# Patient Record
Sex: FEMALE | Race: WHITE
[De-identification: names, ages, dates, MRNs, and addresses within clinical notes are randomized per-mention and may not be internally consistent; named-entity substitution may affect disease eponyms.]

---

## 2017-11-14 ENCOUNTER — HOSPITAL ENCOUNTER (OUTPATIENT)
Dept: HOSPITAL 84 - D.MAMMO | Age: 72
Discharge: HOME | End: 2017-11-14
Attending: FAMILY MEDICINE
Payer: MEDICARE

## 2017-11-14 DIAGNOSIS — Z12.31: Primary | ICD-10-CM

## 2018-10-30 ENCOUNTER — HOSPITAL ENCOUNTER (OUTPATIENT)
Dept: HOSPITAL 84 - D.MAMMO | Age: 73
Discharge: HOME | End: 2018-10-30
Attending: FAMILY MEDICINE
Payer: MEDICARE

## 2018-10-30 DIAGNOSIS — Z12.31: Primary | ICD-10-CM

## 2019-11-30 ENCOUNTER — HOSPITAL ENCOUNTER (INPATIENT)
Dept: HOSPITAL 84 - D.ER | Age: 74
LOS: 6 days | Discharge: HOME | DRG: 372 | End: 2019-12-06
Attending: FAMILY MEDICINE | Admitting: FAMILY MEDICINE
Payer: MEDICARE

## 2019-11-30 VITALS
HEIGHT: 65 IN | BODY MASS INDEX: 24.49 KG/M2 | BODY MASS INDEX: 24.49 KG/M2 | BODY MASS INDEX: 24.49 KG/M2 | HEIGHT: 65 IN | WEIGHT: 147 LBS | WEIGHT: 147 LBS

## 2019-11-30 VITALS — SYSTOLIC BLOOD PRESSURE: 134 MMHG | DIASTOLIC BLOOD PRESSURE: 73 MMHG

## 2019-11-30 VITALS — SYSTOLIC BLOOD PRESSURE: 173 MMHG | DIASTOLIC BLOOD PRESSURE: 82 MMHG

## 2019-11-30 VITALS — DIASTOLIC BLOOD PRESSURE: 74 MMHG | SYSTOLIC BLOOD PRESSURE: 175 MMHG

## 2019-11-30 VITALS — DIASTOLIC BLOOD PRESSURE: 86 MMHG | SYSTOLIC BLOOD PRESSURE: 187 MMHG

## 2019-11-30 DIAGNOSIS — E87.2: ICD-10-CM

## 2019-11-30 DIAGNOSIS — I10: ICD-10-CM

## 2019-11-30 DIAGNOSIS — A04.5: Primary | ICD-10-CM

## 2019-11-30 DIAGNOSIS — N39.0: ICD-10-CM

## 2019-11-30 DIAGNOSIS — E78.5: ICD-10-CM

## 2019-11-30 DIAGNOSIS — K21.9: ICD-10-CM

## 2019-11-30 DIAGNOSIS — I77.4: ICD-10-CM

## 2019-11-30 DIAGNOSIS — D62: ICD-10-CM

## 2019-11-30 DIAGNOSIS — E86.0: ICD-10-CM

## 2019-11-30 DIAGNOSIS — K92.2: ICD-10-CM

## 2019-11-30 DIAGNOSIS — M54.9: ICD-10-CM

## 2019-11-30 DIAGNOSIS — F17.203: ICD-10-CM

## 2019-11-30 DIAGNOSIS — F41.8: ICD-10-CM

## 2019-11-30 DIAGNOSIS — E87.6: ICD-10-CM

## 2019-11-30 DIAGNOSIS — E83.42: ICD-10-CM

## 2019-11-30 LAB
ALBUMIN SERPL-MCNC: 3.7 G/DL (ref 3.4–5)
ALP SERPL-CCNC: 180 U/L (ref 46–116)
ALT SERPL-CCNC: 16 U/L (ref 10–68)
AMORPHOUS SEDIMENT: (no result) /LPF
ANION GAP SERPL CALC-SCNC: 16.4 MMOL/L (ref 8–16)
APPEARANCE UR: CLEAR
BACTERIA #/AREA URNS HPF: (no result) /HPF
BASOPHILS NFR BLD AUTO: 0.2 % (ref 0–2)
BILIRUB SERPL-MCNC: 0.34 MG/DL (ref 0.2–1.3)
BILIRUB SERPL-MCNC: NEGATIVE MG/DL
BUN SERPL-MCNC: 14 MG/DL (ref 7–18)
CALCIUM SERPL-MCNC: 9.4 MG/DL (ref 8.5–10.1)
CHLORIDE SERPL-SCNC: 101 MMOL/L (ref 98–107)
CK MB SERPL-MCNC: 0.4 U/L (ref 0–3.6)
CK SERPL-CCNC: 45 UL (ref 21–215)
CO2 SERPL-SCNC: 26.1 MMOL/L (ref 21–32)
COLOR UR: YELLOW
CREAT SERPL-MCNC: 1.3 MG/DL (ref 0.6–1.3)
CRP SERPL-MCNC: 1.2 MG/DL (ref 0–0.9)
EOSINOPHIL NFR BLD: 0.2 % (ref 0–7)
ERYTHROCYTE [DISTWIDTH] IN BLOOD BY AUTOMATED COUNT: 12.8 % (ref 11.5–14.5)
GLOBULIN SER-MCNC: 3.6 G/L
GLUCOSE SERPL-MCNC: 100 MG/DL
GLUCOSE SERPL-MCNC: 154 MG/DL (ref 74–106)
HCT VFR BLD CALC: 44.6 % (ref 36–48)
HGB BLD-MCNC: 15.2 G/DL (ref 12–16)
IMM GRANULOCYTES NFR BLD: 0.6 % (ref 0–5)
INR PPP: 1.04 (ref 0.85–1.17)
KETONES UR STRIP-MCNC: NEGATIVE MG/DL
LYMPHOCYTES NFR BLD AUTO: 10.7 % (ref 15–50)
MCH RBC QN AUTO: 31.1 PG (ref 26–34)
MCHC RBC AUTO-ENTMCNC: 34.1 G/DL (ref 31–37)
MCV RBC: 91.4 FL (ref 80–100)
MONOCYTES NFR BLD: 5.4 % (ref 2–11)
NEUTROPHILS NFR BLD AUTO: 82.9 % (ref 40–80)
NITRITE UR-MCNC: NEGATIVE MG/ML
NT-PROBNP SERPL-MCNC: 334 PG/ML (ref 0–125)
OSMOLALITY SERPL CALC.SUM OF ELEC: 282 MOSM/KG (ref 275–300)
PH UR STRIP: 8 [PH] (ref 5–6)
PLATELET # BLD: 256 10X3/UL (ref 130–400)
PMV BLD AUTO: 10.6 FL (ref 7.4–10.4)
POTASSIUM SERPL-SCNC: 3.5 MMOL/L (ref 3.5–5.1)
PROT SERPL-MCNC: 7.3 G/DL (ref 6.4–8.2)
PROT UR-MCNC: (no result) MG/DL
PROTHROMBIN TIME: 13.1 SECONDS (ref 11.6–15)
RBC # BLD AUTO: 4.88 10X6/UL (ref 4–5.4)
SODIUM SERPL-SCNC: 140 MMOL/L (ref 136–145)
SP GR UR STRIP: 1 (ref 1–1.02)
SQUAMOUS #/AREA URNS HPF: (no result) /HPF (ref 0–5)
TROPONIN I SERPL-MCNC: < 0.017 NG/ML (ref 0–0.06)
UROBILINOGEN UR-MCNC: NORMAL MG/DL
WBC # BLD AUTO: 19.7 10X3/UL (ref 4.8–10.8)
WBC #/AREA URNS HPF: (no result) /HPF

## 2019-12-01 VITALS — SYSTOLIC BLOOD PRESSURE: 177 MMHG | DIASTOLIC BLOOD PRESSURE: 78 MMHG

## 2019-12-01 VITALS — SYSTOLIC BLOOD PRESSURE: 150 MMHG | DIASTOLIC BLOOD PRESSURE: 68 MMHG

## 2019-12-01 VITALS — DIASTOLIC BLOOD PRESSURE: 70 MMHG | SYSTOLIC BLOOD PRESSURE: 117 MMHG

## 2019-12-01 VITALS — SYSTOLIC BLOOD PRESSURE: 144 MMHG | DIASTOLIC BLOOD PRESSURE: 60 MMHG

## 2019-12-01 VITALS — SYSTOLIC BLOOD PRESSURE: 113 MMHG | DIASTOLIC BLOOD PRESSURE: 72 MMHG

## 2019-12-01 LAB
ANION GAP SERPL CALC-SCNC: 12.7 MMOL/L (ref 8–16)
BASOPHILS NFR BLD AUTO: 0.1 % (ref 0–2)
BUN SERPL-MCNC: 14 MG/DL (ref 7–18)
CALCIUM SERPL-MCNC: 8.6 MG/DL (ref 8.5–10.1)
CHLORIDE SERPL-SCNC: 103 MMOL/L (ref 98–107)
CO2 SERPL-SCNC: 22.8 MMOL/L (ref 21–32)
CREAT SERPL-MCNC: 1.2 MG/DL (ref 0.6–1.3)
EOSINOPHIL NFR BLD: 0 % (ref 0–7)
ERYTHROCYTE [DISTWIDTH] IN BLOOD BY AUTOMATED COUNT: 12.9 % (ref 11.5–14.5)
GLUCOSE SERPL-MCNC: 137 MG/DL (ref 74–106)
HCT VFR BLD CALC: 31.2 % (ref 36–48)
HCT VFR BLD CALC: 32.9 % (ref 36–48)
HCT VFR BLD CALC: 34.4 % (ref 36–48)
HGB BLD-MCNC: 10.4 G/DL (ref 12–16)
HGB BLD-MCNC: 10.9 G/DL (ref 12–16)
HGB BLD-MCNC: 11.4 G/DL (ref 12–16)
IMM GRANULOCYTES NFR BLD: 0.2 % (ref 0–5)
INR PPP: 1.23 (ref 0.85–1.17)
LYMPHOCYTES NFR BLD AUTO: 10.1 % (ref 15–50)
MCH RBC QN AUTO: 30.1 PG (ref 26–34)
MCHC RBC AUTO-ENTMCNC: 33.1 G/DL (ref 31–37)
MCV RBC: 90.8 FL (ref 80–100)
MONOCYTES NFR BLD: 6.4 % (ref 2–11)
NEUTROPHILS NFR BLD AUTO: 83.2 % (ref 40–80)
OSMOLALITY SERPL CALC.SUM OF ELEC: 272 MOSM/KG (ref 275–300)
PLATELET # BLD: 208 10X3/UL (ref 130–400)
PMV BLD AUTO: 10.3 FL (ref 7.4–10.4)
POTASSIUM SERPL-SCNC: 3.5 MMOL/L (ref 3.5–5.1)
PROTHROMBIN TIME: 14.9 SECONDS (ref 11.6–15)
RBC # BLD AUTO: 3.79 10X6/UL (ref 4–5.4)
SODIUM SERPL-SCNC: 135 MMOL/L (ref 136–145)
WBC # BLD AUTO: 13.1 10X3/UL (ref 4.8–10.8)

## 2019-12-01 NOTE — NUR
BEDSIDE REPORT RECEIVED AND CARE OF PT ASSUMED. PT LYING IN SUPINE POSITION
VISITING WITH FAMILY MEMBER. IV TO LEFT AC PATENT WITH NS INFUSING 
ML/HR. PCA W/ MORPHINE IN USE FOR PAIN CONTROL. WILL MONITOR FOR NEEDS.

## 2019-12-01 NOTE — NUR
PATIENT IS ALERT AND ORENTED ABLE TO VOICE NEEDS AND WANTS TO STAFF. UP AT
[place], WATER IN REACH AT BEDSIDE CALL LIGHT IN REACH.  HAS HAD BM X 4 THIS SHIFT
WITH BRIGHT RED BLOOD PRESENT  DECREASING AMOUNT EACH TIME AND A CLOT WAS IN
THE LAST ONEABOUT THE SIZE OF A QUARTER. WITH NO STOOL PRESENT WITH EACH
MOVEMENT. CONTIUES TO HAVE ABD PAIN.

## 2019-12-02 VITALS — DIASTOLIC BLOOD PRESSURE: 71 MMHG | SYSTOLIC BLOOD PRESSURE: 161 MMHG

## 2019-12-02 VITALS — DIASTOLIC BLOOD PRESSURE: 66 MMHG | SYSTOLIC BLOOD PRESSURE: 149 MMHG

## 2019-12-02 VITALS — DIASTOLIC BLOOD PRESSURE: 70 MMHG | SYSTOLIC BLOOD PRESSURE: 150 MMHG

## 2019-12-02 VITALS — DIASTOLIC BLOOD PRESSURE: 50 MMHG | SYSTOLIC BLOOD PRESSURE: 109 MMHG

## 2019-12-02 VITALS — DIASTOLIC BLOOD PRESSURE: 62 MMHG | SYSTOLIC BLOOD PRESSURE: 128 MMHG

## 2019-12-02 VITALS — DIASTOLIC BLOOD PRESSURE: 56 MMHG | SYSTOLIC BLOOD PRESSURE: 132 MMHG

## 2019-12-02 LAB
ANION GAP SERPL CALC-SCNC: 12.5 MMOL/L (ref 8–16)
BASOPHILS NFR BLD AUTO: 0.1 % (ref 0–2)
BUN SERPL-MCNC: 6 MG/DL (ref 7–18)
CALCIUM SERPL-MCNC: 8.1 MG/DL (ref 8.5–10.1)
CHLORIDE SERPL-SCNC: 105 MMOL/L (ref 98–107)
CO2 SERPL-SCNC: 24.7 MMOL/L (ref 21–32)
CREAT SERPL-MCNC: 0.7 MG/DL (ref 0.6–1.3)
EOSINOPHIL NFR BLD: 0.1 % (ref 0–7)
ERYTHROCYTE [DISTWIDTH] IN BLOOD BY AUTOMATED COUNT: 13 % (ref 11.5–14.5)
FERRITIN SERPL-MCNC: 90 NG/ML (ref 3–244)
GLUCOSE SERPL-MCNC: 100 MG/DL (ref 74–106)
HCT VFR BLD CALC: 29.4 % (ref 36–48)
HCT VFR BLD CALC: 29.5 % (ref 36–48)
HCT VFR BLD CALC: 29.9 % (ref 36–48)
HCT VFR BLD CALC: 31.3 % (ref 36–48)
HGB BLD-MCNC: 10.3 G/DL (ref 12–16)
HGB BLD-MCNC: 9.6 G/DL (ref 12–16)
HGB BLD-MCNC: 9.8 G/DL (ref 12–16)
HGB BLD-MCNC: 9.9 G/DL (ref 12–16)
IMM GRANULOCYTES NFR BLD: 0.3 % (ref 0–5)
IRON SERPL-MCNC: 27 UG/DL (ref 35–150)
LDH1 SERPL-CCNC: 282 U/L (ref 81–234)
LYMPHOCYTES NFR BLD AUTO: 14.2 % (ref 15–50)
MCH RBC QN AUTO: 30.1 PG (ref 26–34)
MCHC RBC AUTO-ENTMCNC: 32.9 G/DL (ref 31–37)
MCV RBC: 91.5 FL (ref 80–100)
MONOCYTES NFR BLD: 5.6 % (ref 2–11)
NEUTROPHILS NFR BLD AUTO: 79.7 % (ref 40–80)
OSMOLALITY SERPL CALC.SUM OF ELEC: 275 MOSM/KG (ref 275–300)
PLATELET # BLD: 178 10X3/UL (ref 130–400)
PMV BLD AUTO: 10.6 FL (ref 7.4–10.4)
POTASSIUM SERPL-SCNC: 3.2 MMOL/L (ref 3.5–5.1)
RBC # BLD AUTO: 3.42 10X6/UL (ref 4–5.4)
SAO2 % BLD FROM PO2: 12 % (ref 15–55)
SODIUM SERPL-SCNC: 139 MMOL/L (ref 136–145)
TIBC SERPL-MCNC: 222 UG/DL (ref 260–445)
UIBC SERPL-MCNC: 195 UG/DL (ref 150–375)
WBC # BLD AUTO: 11.9 10X3/UL (ref 4.8–10.8)

## 2019-12-02 NOTE — NUR
PT RESTING IN BED.  NO ACUTE DISTRESS NOTED.  PT RATES PAIN 4/10 AT THIS TIME.
MORPHINE PCA INTACT AND IN USE.  IV TO LEFT AC WITH NS @ 100 ML/HR INFUSING
VIA PUMP.  SITE WITHOUT REDNESS OR EDEMA.  REPORTS ABDOMINAL TENDERNESS AT
TIMES.  DENIES FURTHER NEEDS AT THIS TIME.  CL WITHIN REACH.  ENCOURAGED TO
CALL WITH NEEDS.  CONTINUE POC

## 2019-12-02 NOTE — NUR
REC'D IN BED DURING SHIFT CHGE WALKING ROUNDS IN BED HOB UP 40 DEGREES. FAMILY
AT BEDSIDE DENIES ANY DISCOMFORT AT PRESENT TIME.INSTRUCTED TO LET NURSE
ASSESS ANY RECTAL BLEEDING.VOICES UNDERSTANDING WILL CONTINUE TO MONIOR FOR
ANY CHGES. AND FOLLOW CURRENT PLAN OF CARE

## 2019-12-03 VITALS — DIASTOLIC BLOOD PRESSURE: 64 MMHG | SYSTOLIC BLOOD PRESSURE: 132 MMHG

## 2019-12-03 VITALS — SYSTOLIC BLOOD PRESSURE: 144 MMHG | DIASTOLIC BLOOD PRESSURE: 58 MMHG

## 2019-12-03 VITALS — SYSTOLIC BLOOD PRESSURE: 159 MMHG | DIASTOLIC BLOOD PRESSURE: 73 MMHG

## 2019-12-03 VITALS — SYSTOLIC BLOOD PRESSURE: 160 MMHG | DIASTOLIC BLOOD PRESSURE: 72 MMHG

## 2019-12-03 VITALS — SYSTOLIC BLOOD PRESSURE: 145 MMHG | DIASTOLIC BLOOD PRESSURE: 50 MMHG

## 2019-12-03 VITALS — SYSTOLIC BLOOD PRESSURE: 116 MMHG | DIASTOLIC BLOOD PRESSURE: 52 MMHG

## 2019-12-03 LAB
ANION GAP SERPL CALC-SCNC: 11.7 MMOL/L (ref 8–16)
BASOPHILS NFR BLD AUTO: 0.1 % (ref 0–2)
BUN SERPL-MCNC: 3 MG/DL (ref 7–18)
CALCIUM SERPL-MCNC: 8.4 MG/DL (ref 8.5–10.1)
CHLORIDE SERPL-SCNC: 105 MMOL/L (ref 98–107)
CO2 SERPL-SCNC: 27.4 MMOL/L (ref 21–32)
CREAT SERPL-MCNC: 0.5 MG/DL (ref 0.6–1.3)
EOSINOPHIL NFR BLD: 0.7 % (ref 0–7)
ERYTHROCYTE [DISTWIDTH] IN BLOOD BY AUTOMATED COUNT: 12.9 % (ref 11.5–14.5)
GLUCOSE SERPL-MCNC: 91 MG/DL (ref 74–106)
HCT VFR BLD CALC: 30.3 % (ref 36–48)
HGB BLD-MCNC: 10 G/DL (ref 12–16)
IMM GRANULOCYTES NFR BLD: 0.4 % (ref 0–5)
LYMPHOCYTES NFR BLD AUTO: 18.1 % (ref 15–50)
MCH RBC QN AUTO: 30.3 PG (ref 26–34)
MCHC RBC AUTO-ENTMCNC: 33 G/DL (ref 31–37)
MCV RBC: 91.8 FL (ref 80–100)
MONOCYTES NFR BLD: 4.9 % (ref 2–11)
NEUTROPHILS NFR BLD AUTO: 75.8 % (ref 40–80)
OSMOLALITY SERPL CALC.SUM OF ELEC: 277 MOSM/KG (ref 275–300)
PLATELET # BLD: 195 10X3/UL (ref 130–400)
PMV BLD AUTO: 10.5 FL (ref 7.4–10.4)
POTASSIUM SERPL-SCNC: 3.1 MMOL/L (ref 3.5–5.1)
RBC # BLD AUTO: 3.3 10X6/UL (ref 4–5.4)
SODIUM SERPL-SCNC: 141 MMOL/L (ref 136–145)
WBC # BLD AUTO: 10 10X3/UL (ref 4.8–10.8)

## 2019-12-03 NOTE — MORECARE
CASE MANAGEMENT DISCHARGE SUMMARY
 
 
PATIENT: LINH COSTA                     UNIT: [id number]
ACCOUNT#: [id number]                       ADM DATE: 19
AGE: 74     : 47  SEX: F            ROOM/BED: D.2235    
AUTHOR: CARLITA MONSIVAIS                             PHYSICIAN:                               
 
REFERRING PHYSICIAN: KEVON KENT MD          
DATE OF SERVICE: 19
Discharge Plan
 
 
Patient Name: LINH COSTA
Facility: [facility]:[place]
Encounter #: [id number]
Medical Record #: [id number]
: 1945
Planned Disposition: Home
Anticipated Discharge Date: 19
 
Discharge Date: 
Expected LOS: 4
Initial Reviewer: [email or web address]
Initial Review Date: 2019
Generated: 12/3/19   5:40 pm 
Comments
 
DCP- Discharge Planning
 
Updated by [email or web address]: Acacia Brush on 12/3/19   3:39 pm CT
Patient Name: LINH COSTA                                     
Admission Status: ER   
Accout number: [id number]                              
Admission Date: 2019   
: 1945                                                        
Admission Diagnosis:   
Attending: ISSA                                                
Current LOS:  3   
  
Anticipated DC Date: 2019   
Planned Disposition: Home   
Primary Insurance: MEDICARE A & B   
  
  
Discharge Planning Comments: CM met with patient to complete initial dc 
planning assessment, she is alone in the room.  CM educated patient on the CM 
role and verbal consent given by patient to complete assessment.   Patient 
lives at home with her spouse and her brother.  At discharge patient plans to 
return  and feels this is a safe discharge.  CM discussed availability of 
 
home health, rehab services, and medical equipment. Patient denied known 
discharge needs at this time. CM will continue to follow and will assist as 
needed with dc plans/needs.    
  
  
  
  
  
  
  
  
: Acacia Brush
 DCPIA - Discharge Planning Initial Assessment
 
Updated by [email or web address]: Acacia Brush on 12/3/19   4:38 pm
*  Is the patient Alert and Oriented?
Yes
*  How many steps to enter\exit or inside your home? 3/2 flight *  PCP Dr. Saxena * 
Pharmacy
[facility] on [address]
*  Preadmission Environment
Home with Family
*  ADLs
Independent
*  Equipment
None
*  List name and contact numbers for known caregivers / representatives who 
currently or will assist patient after discharge:
Kojo Christopher [facility] [phone] or [phone]
*  Verbal permission to speak to the caregivers and representatives has been 
obtained from the patient.
Yes
*  Community resources currently utilized
None
*  Additional services required to return to the preadmission environment?
No
*  Can the patient safely return to the preadmission environment?
Yes
*  Has this patient been hospitalized within the prior 30 days at any 
hospital?
No
 
 
 
 
 
Coverage Notice
 
Reviewer: [email or web address] Federico Brush
 
Notice Issued Date-Time: 2019  16:39
Notice Type: IM Discharge Notice
 
 
Notice Delivered To: Patient
Relationship to Patient: Self
Representative Name: 
 
Delivery Method: HAND - Hand Delivered
Carol Days:
Prior Verbal Notification: 
 
Recipient Understood Notice: Yes
Recipient Signature: Yes
Med Rec Note Co-signed by Attending:
 
Coverage Notice Comment:  IMM explained, signed, given, copy placed in MR
 
Last DP export: 12/3/19   3:31 p
Patient Name: LINH COSTA
Encounter #: [id number]
Page [id number]
 
 
 
 
 
Electronically Signed by CARLITA MONSIVAIS on 19 at 1641
 
 
 
 
 
 
**All edits/amendments must be made on the electronic document**
 
DICTATION DATE: 19 1640     : SELENA  19 1640     
RPT#: [id number]                                DC DATE:        
                                               STATUS: ADM IN  
[facility]
[address] [place] [address]
***END OF REPORT***

## 2019-12-03 NOTE — NUR
c/o nausea no emesis during chge. of shift.walking rounds zofran given slow iv
as ordered for relief.will continue to monitor for any chges and follow
current plan of care. family at bedside dr. maldonado here

## 2019-12-03 NOTE — MORECARE
CASE MANAGEMENT DISCHARGE SUMMARY
 
 
PATIENT: LINH COSTA                     UNIT: [id number]
ACCOUNT#: [id number]                       ADM DATE: 19
AGE: 74     : 47  SEX: F            ROOM/BED: D.[facility]    
AUTHOR: CARLITA MONSIVAIS                             PHYSICIAN:                               
 
REFERRING PHYSICIAN: KEVON KENT MD          
DATE OF SERVICE: 19
Discharge Plan
 
 
Patient Name: LINH COSTA
Facility: [facility]:[place]
Encounter #: [id number]
Medical Record #: [id number]
: 1945
Planned Disposition: Home
Anticipated Discharge Date: 19
 
Discharge Date: 
Expected LOS: 4
Initial Reviewer: [email or web address]
Initial Review Date: 2019
Generated: 12/3/19   5:31 pm 
  
 
 
 
 
 
 
 
Patient Name: LINH COSTA
 
Encounter #: [id number]
Page [id number]
 
 
 
 
 
Electronically Signed by CARLITA MONSIVAIS on 19 at 1631
 
 
 
 
 
 
**All edits/amendments must be made on the electronic document**
 
DICTATION DATE: 19 1631     : SELENA  19 1631     
RPT#: [id number]                                DC DATE:        
                                               STATUS: ADM IN  
[facility]
[address] [place] [address]
***END OF REPORT***

## 2019-12-03 NOTE — NUR
PT RESTING IN BED WITH DAUGHTER AT BEDSIDE.  RESP EVEN AND UNLABORED.  PT
REPORTS BACK PAIN AT THIS TIME 7/10.  MORPHINE PCA INTACT AND IN USE.  IV TO
LEFT AC WITH NS @ 30ML/HR INFUSING VIA PUMP.  SITE WITHOUT REDNESS OR EDEMA.
DENIES FURTHER NEEDS AT THIS TIME.  CL WITHIN REACH.  ENCOURAGED TO CALL WITH
NEEDS.  CONTINUE POC

## 2019-12-04 VITALS — SYSTOLIC BLOOD PRESSURE: 164 MMHG | DIASTOLIC BLOOD PRESSURE: 76 MMHG

## 2019-12-04 VITALS — DIASTOLIC BLOOD PRESSURE: 72 MMHG | SYSTOLIC BLOOD PRESSURE: 148 MMHG

## 2019-12-04 VITALS — DIASTOLIC BLOOD PRESSURE: 71 MMHG | SYSTOLIC BLOOD PRESSURE: 172 MMHG

## 2019-12-04 VITALS — SYSTOLIC BLOOD PRESSURE: 142 MMHG | DIASTOLIC BLOOD PRESSURE: 70 MMHG

## 2019-12-04 VITALS — SYSTOLIC BLOOD PRESSURE: 166 MMHG | DIASTOLIC BLOOD PRESSURE: 75 MMHG

## 2019-12-04 VITALS — SYSTOLIC BLOOD PRESSURE: 136 MMHG | DIASTOLIC BLOOD PRESSURE: 64 MMHG

## 2019-12-04 LAB
ANION GAP SERPL CALC-SCNC: 12.5 MMOL/L (ref 8–16)
BASOPHILS NFR BLD AUTO: 0.1 % (ref 0–2)
BUN SERPL-MCNC: 4 MG/DL (ref 7–18)
CALCIUM SERPL-MCNC: 8.3 MG/DL (ref 8.5–10.1)
CHLORIDE SERPL-SCNC: 107 MMOL/L (ref 98–107)
CO2 SERPL-SCNC: 29 MMOL/L (ref 21–32)
CREAT SERPL-MCNC: 0.6 MG/DL (ref 0.6–1.3)
EOSINOPHIL NFR BLD: 1.2 % (ref 0–7)
ERYTHROCYTE [DISTWIDTH] IN BLOOD BY AUTOMATED COUNT: 12.8 % (ref 11.5–14.5)
GLUCOSE SERPL-MCNC: 100 MG/DL (ref 74–106)
HCT VFR BLD CALC: 30.4 % (ref 36–48)
HGB BLD-MCNC: 10.1 G/DL (ref 12–16)
IMM GRANULOCYTES NFR BLD: 1.2 % (ref 0–5)
LYMPHOCYTES NFR BLD AUTO: 20.4 % (ref 15–50)
MCH RBC QN AUTO: 30.2 PG (ref 26–34)
MCHC RBC AUTO-ENTMCNC: 33.2 G/DL (ref 31–37)
MCV RBC: 91 FL (ref 80–100)
MONOCYTES NFR BLD: 6.7 % (ref 2–11)
NEUTROPHILS NFR BLD AUTO: 70.4 % (ref 40–80)
OSMOLALITY SERPL CALC.SUM OF ELEC: 285 MOSM/KG (ref 275–300)
PLATELET # BLD: 207 10X3/UL (ref 130–400)
PMV BLD AUTO: 10.8 FL (ref 7.4–10.4)
POTASSIUM SERPL-SCNC: 3.5 MMOL/L (ref 3.5–5.1)
RBC # BLD AUTO: 3.34 10X6/UL (ref 4–5.4)
SODIUM SERPL-SCNC: 145 MMOL/L (ref 136–145)
WBC # BLD AUTO: 6.7 10X3/UL (ref 4.8–10.8)

## 2019-12-04 NOTE — NUR
LYING IN BED WITH TELEVISION ON, FAMILY AT BEDSIDE. COMPLAINS OF EFFECTS OF
IRON. INFUSION IS COMPLETE, WILL FLUSH IV. IV SITE APPEARS TO HAVE BECOME
INFILTRATED. RESITED TO RIGHT OUTER FOREARM WITH 22G, OLD IV TO LEFT AC DCD
WITH TIP INTACT. COMPLAINS OF NAUSEA AND UNABLE TO SLEEP, WILL MEDICATE
ACCORDING TO MAR, WILL NOTE ANY CHANGE.

## 2019-12-05 VITALS — DIASTOLIC BLOOD PRESSURE: 82 MMHG | SYSTOLIC BLOOD PRESSURE: 172 MMHG

## 2019-12-05 VITALS — DIASTOLIC BLOOD PRESSURE: 74 MMHG | SYSTOLIC BLOOD PRESSURE: 150 MMHG

## 2019-12-05 VITALS — DIASTOLIC BLOOD PRESSURE: 88 MMHG | SYSTOLIC BLOOD PRESSURE: 191 MMHG

## 2019-12-05 VITALS — DIASTOLIC BLOOD PRESSURE: 73 MMHG | SYSTOLIC BLOOD PRESSURE: 160 MMHG

## 2019-12-05 VITALS — DIASTOLIC BLOOD PRESSURE: 89 MMHG | SYSTOLIC BLOOD PRESSURE: 182 MMHG

## 2019-12-05 VITALS — SYSTOLIC BLOOD PRESSURE: 152 MMHG | DIASTOLIC BLOOD PRESSURE: 68 MMHG

## 2019-12-05 LAB
ANION GAP SERPL CALC-SCNC: 7.3 MMOL/L (ref 8–16)
BUN SERPL-MCNC: 2 MG/DL (ref 7–18)
CALCIUM SERPL-MCNC: 8.3 MG/DL (ref 8.5–10.1)
CHLORIDE SERPL-SCNC: 106 MMOL/L (ref 98–107)
CO2 SERPL-SCNC: 30.5 MMOL/L (ref 21–32)
CREAT SERPL-MCNC: 0.5 MG/DL (ref 0.6–1.3)
ERYTHROCYTE [DISTWIDTH] IN BLOOD BY AUTOMATED COUNT: 12.9 % (ref 11.5–14.5)
GLUCOSE SERPL-MCNC: 96 MG/DL (ref 74–106)
HCT VFR BLD CALC: 28.1 % (ref 36–48)
HGB BLD-MCNC: 9.6 G/DL (ref 12–16)
LYMPHOCYTES NFR BLD AUTO: 29.8 % (ref 15–50)
MAGNESIUM SERPL-MCNC: 1.5 MG/DL (ref 1.8–2.4)
MCH RBC QN AUTO: 30.8 PG (ref 26–34)
MCHC RBC AUTO-ENTMCNC: 34.2 G/DL (ref 31–37)
MCV RBC: 90.1 FL (ref 80–100)
NEUTROPHILS NFR BLD AUTO: 61.7 % (ref 40–80)
OSMOLALITY SERPL CALC.SUM OF ELEC: 276 MOSM/KG (ref 275–300)
PLATELET # BLD: 216 10X3/UL (ref 130–400)
PMV BLD AUTO: 10.2 FL (ref 7.4–10.4)
POTASSIUM SERPL-SCNC: 2.8 MMOL/L (ref 3.5–5.1)
POTASSIUM SERPL-SCNC: 3.6 MMOL/L (ref 3.5–5.1)
RBC # BLD AUTO: 3.12 10X6/UL (ref 4–5.4)
SODIUM SERPL-SCNC: 141 MMOL/L (ref 136–145)
WBC # BLD AUTO: 5.8 10X3/UL (ref 4.8–10.8)

## 2019-12-05 NOTE — NUR
BEDSIDE REPORT RECEIVED AND CARE OF PT ASSUMED. PT LYING IN SUPINE POSITION
VISITING WITH SPOUSE. PT WAITING FOR MD TO POSSIBLY D/C TODAY.

## 2019-12-05 NOTE — NUR
Nutrition follow-up:
Diet: Regular
PO intake % of last 3 meals
Labs reviewed; Mg,K low and being replaced
Wt: 146#
PO intake good at this time.
RDN following.

## 2019-12-05 NOTE — NUR
CRITICAL POTASSIUM NOTED AS 2.8. APN MADE AWARE. ELECTROLYTE PROTOCOL IN
PROGRESS. DENIES NEEDS OR PAIN AT THIS TIME. PT STATES SHE HAD A BM HOWEVER,
HAT DID NOT CATCH SAMPLE. HAT ADJUSTED AND PT EDUCATED ON IMPORTANCE OF
SAMPLE. RR EVEN AND UNLABORED. BED IN LOWEST POSITION.CALL LIGHT WITHIN REACH.
WILL CONTINUE TO MONITOR.

## 2019-12-05 NOTE — NUR
REQUESTED PAIN MEDICATION AT 0133 THIS SHIFT, EFFECTIVE. SLEPT MOST OF SHIFT
WITH FAMILY AT BEDSIDE. WILL NOTE ANY CHANGE.

## 2019-12-06 VITALS — DIASTOLIC BLOOD PRESSURE: 78 MMHG | SYSTOLIC BLOOD PRESSURE: 150 MMHG

## 2019-12-06 VITALS — DIASTOLIC BLOOD PRESSURE: 86 MMHG | SYSTOLIC BLOOD PRESSURE: 186 MMHG

## 2019-12-06 VITALS — DIASTOLIC BLOOD PRESSURE: 83 MMHG | SYSTOLIC BLOOD PRESSURE: 185 MMHG

## 2019-12-06 LAB
ANION GAP SERPL CALC-SCNC: 10.4 MMOL/L (ref 8–16)
BASOPHILS NFR BLD AUTO: 0.3 % (ref 0–2)
BUN SERPL-MCNC: 3 MG/DL (ref 7–18)
CALCIUM SERPL-MCNC: 8.5 MG/DL (ref 8.5–10.1)
CHLORIDE SERPL-SCNC: 105 MMOL/L (ref 98–107)
CO2 SERPL-SCNC: 28.7 MMOL/L (ref 21–32)
CREAT SERPL-MCNC: 0.6 MG/DL (ref 0.6–1.3)
EOSINOPHIL NFR BLD: 1.8 % (ref 0–7)
ERYTHROCYTE [DISTWIDTH] IN BLOOD BY AUTOMATED COUNT: 12.9 % (ref 11.5–14.5)
GLUCOSE SERPL-MCNC: 107 MG/DL (ref 74–106)
HCT VFR BLD CALC: 31 % (ref 36–48)
HGB BLD-MCNC: 10.4 G/DL (ref 12–16)
IMM GRANULOCYTES NFR BLD: 3.7 % (ref 0–5)
LYMPHOCYTES NFR BLD AUTO: 27.3 % (ref 15–50)
MCH RBC QN AUTO: 30.3 PG (ref 26–34)
MCHC RBC AUTO-ENTMCNC: 33.5 G/DL (ref 31–37)
MCV RBC: 90.4 FL (ref 80–100)
MONOCYTES NFR BLD: 10.4 % (ref 2–11)
NEUTROPHILS NFR BLD AUTO: 56.5 % (ref 40–80)
OSMOLALITY SERPL CALC.SUM OF ELEC: 277 MOSM/KG (ref 275–300)
PLATELET # BLD: 278 10X3/UL (ref 130–400)
PMV BLD AUTO: 10 FL (ref 7.4–10.4)
POTASSIUM SERPL-SCNC: 3.1 MMOL/L (ref 3.5–5.1)
RBC # BLD AUTO: 3.43 10X6/UL (ref 4–5.4)
SODIUM SERPL-SCNC: 141 MMOL/L (ref 136–145)
WBC # BLD AUTO: 6.8 10X3/UL (ref 4.8–10.8)

## 2019-12-06 NOTE — NUR
ALERT AND ORIENTED X4. ABDOMEN SOFT WITH BOWEL SOUNDS NOTED X4 WITH
FLATULANCE. OV TO RT. F/A WITH IVF INFUSING AT PRESCRIBED RATE. TELEMETRY
INTACT. DENEIS ANY PAIN OR DISCOMFORT WITH FAMILY PRESENT. ENCOURAGED TO USE
CALL LIGHT FOR ASSIST.

## 2019-12-06 NOTE — MORECARE
CASE MANAGEMENT DISCHARGE SUMMARY
 
 
PATIENT: LINH COSTA                     UNIT: [id number]
ACCOUNT#: [id number]                       ADM DATE: 19
AGE: 74     : 47  SEX: F            ROOM/BED: D.2235    
AUTHOR: CARLITA MONSIVAIS                             PHYSICIAN:                               
 
REFERRING PHYSICIAN: KEVON KENT MD          
DATE OF SERVICE: 19
Discharge Plan
 
 
Patient Name: LINH COSTA
Facility: [facility]:[place]
Encounter #: [id number]
Medical Record #: [id number]
: 1945
Planned Disposition: Home
Anticipated Discharge Date: 19
 
Discharge Date: 
Expected LOS: 4
Initial Reviewer: [email or web address]
Initial Review Date: 2019
Generated: 19  10:16 am 
Comments
 
DCP- Discharge Planning
 
Updated by [email or web address]: Acacia Brush on 19   8:14 am CT
Patient Name:  LINH COSTA   
Encounter No:  [id number]   
:  1945   
Primary Insurance:  MEDICARE A & B  
Anticipated DC Date: 2019   
Planned Disposition:  Home  
External Planned Provider: :   
  
  
DCP follow-up note: Patient and family in agreement with discharge plan. No 
changes to plan. Case management will follow and assist as needed.  
Acacia Brush
DCP- Discharge Planning
 
Updated by [id number]: Acacia Brush on 12/3/19   3:39 pm CT
Patient Name: LINH COSTA                                     
Admission Status: ER   
Accout number: [id number]                              
Admission Date: 2019   
 
: 1945                                                        
Admission Diagnosis:   
Attending: ISSA                                                
Current LOS:  3   
  
Anticipated DC Date: 2019   
Planned Disposition: Home   
Primary Insurance: MEDICARE A & B   
  
  
Discharge Planning Comments: CM met with patient to complete initial dc 
planning assessment, she is alone in the room.  CM educated patient on the CM 
role and verbal consent given by patient to complete assessment.   Patient 
lives at home with her spouse and her brother.  At discharge patient plans to 
return  and feels this is a safe discharge.  CM discussed availability of 
home health, rehab services, and medical equipment. Patient denied known 
discharge needs at this time. CM will continue to follow and will assist as 
needed with dc plans/needs.    
  
  
  
  
  
  
  
  
: Acacia Brush
 DCPIA - Discharge Planning Initial Assessment
 
Updated by [id number]: Acacia Brush on 12/3/19   4:38 pm
*  Is the patient Alert and Oriented?
Yes
*  How many steps to enter\exit or inside your home? 3/2 flight *  PCP Dr. Saxena * 
Pharmacy
[facility] on [address]
*  Preadmission Environment
Home with Family
*  ADLs
Independent
*  Equipment
None
*  List name and contact numbers for known caregivers / representatives who 
currently or will assist patient after discharge:
Kojo Christopher - spouse - [phone] or [phone]
*  Verbal permission to speak to the caregivers and representatives has been 
obtained from the patient.
Yes
*  Community resources currently utilized
None
*  Additional services required to return to the preadmission environment?
No
*  Can the patient safely return to the preadmission environment?
 
Yes
*  Has this patient been hospitalized within the prior 30 days at any 
hospital?
No
 
 
 
 
 
Coverage Notice
 
Reviewer: [email or web address] Brush
 
Notice Issued Date-Time: 2019  16:39
Notice Type: IM Discharge Notice
 
Notice Delivered To: Patient
Relationship to Patient: Self
Representative Name: 
 
Delivery Method: HAND - Hand Delivered
Carol Days:
Prior Verbal Notification: 
 
Recipient Understood Notice: Yes
Recipient Signature: Yes
Med Rec Note Co-signed by Attending:
 
Coverage Notice Comment:  IMM explained, signed, given, copy placed in MR
Reviewer: [email or web address] Brush
 
Notice Issued Date-Time: 2019   9:13
Notice Type: IM Discharge Notice
 
Notice Delivered To: Patient
Relationship to Patient: 
Representative Name: 
 
Delivery Method: HAND - Hand Delivered
Carol Days:
Prior Verbal Notification: 
 
Recipient Understood Notice: Yes
Recipient Signature: Yes
Med Rec Note Co-signed by Attending:
 
Coverage Notice Comment:  IMM explained, signed, given, copy placed in MR
 
Last DP export: 12/3/19   3:41 p
Patient Name: LINH COSTA
 
Encounter #: [id number]
Page [id number]
 
 
 
 
 
Electronically Signed by CARLITA MONSIVAIS on 19 at 0916
 
 
 
 
 
 
**All edits/amendments must be made on the electronic document**
 
DICTATION DATE: 19     : SELENA  19     
RPT#: [id number]                                DC DATE:        
                                               STATUS: ADM IN  
[facility]
[address] [place] [address]
***END OF REPORT***

## 2019-12-06 NOTE — NUR
IV DISCONTINUED AND VERBALIZED UNDERSTANDING OF DISCHARGE INSTRUCTIONS. STABLE
AT TIME OF DISCHARGE UNDER CARE OF .

## 2019-12-30 ENCOUNTER — HOSPITAL ENCOUNTER (OUTPATIENT)
Dept: HOSPITAL 84 - D.MAMMO | Age: 74
Discharge: HOME | End: 2019-12-30
Attending: FAMILY MEDICINE
Payer: MEDICARE

## 2019-12-30 VITALS — BODY MASS INDEX: 24.4 KG/M2

## 2019-12-30 DIAGNOSIS — Z12.31: Primary | ICD-10-CM
